# Patient Record
(demographics unavailable — no encounter records)

---

## 2017-03-29 NOTE — MAMMOGRAPHY REPORT
BILATERAL DIGITAL SCREENING MAMMOGRAM TOMOSYNTHESIS WITH CAD: 3/29/2017

CLINICAL HISTORY: Asymptomatic. Personal history of breast cancer.  





TECHNIQUE:  Breast tomosynthesis in addition to standard 2D mammography was performed. Current study
 was also evaluated with a Computer Aided Detection (CAD) system.  



COMPARISON: Comparison is made to exams dated:  4/12/2012 mammogram and 11/20/2015 mammogram.   



BREAST COMPOSITION:  There are scattered areas of fibroglandular density in both breasts.  



FINDINGS:  No suspicious masses, calcifications, or areas of architectural distortion are noted in e
ither breast. There has been no significant interval change compared to prior exams.  

There are stable post surgical changes in the right 12:00 breast from prior lumpectomy, including de
nsity, architectural distortion, and coarse benign dystrophic calcifications at the lumpectomy bed. 
 A linear scar marker denotes a scar on the right superior breast.  Bilateral benign-appearing calci
fications are not significantly changed.



IMPRESSION:  ACR BI-RADS CATEGORY 2: BENIGN

There is no mammographic evidence of malignancy. A 1 year screening mammogram is recommended.  The p
atient will receive written notification of the results.  





Approximately 10% of breast cancers are not detected with mammography. A negative mammographic repor
t should not delay biopsy if a clinically suggestive mass is present.



Rachel Perez M.D.          

/:3/29/2017 12:36:47  



Imaging Technologist: Jessica HUITRON([email or web address])(M), [facility]

letter sent: Normal 1/2  

BI-RADS Code: ACR BI-RADS Category 2: Benign